# Patient Record
Sex: MALE | Race: WHITE | NOT HISPANIC OR LATINO | Employment: FULL TIME | URBAN - METROPOLITAN AREA
[De-identification: names, ages, dates, MRNs, and addresses within clinical notes are randomized per-mention and may not be internally consistent; named-entity substitution may affect disease eponyms.]

---

## 2018-03-04 ENCOUNTER — HOSPITAL ENCOUNTER (EMERGENCY)
Facility: MEDICAL CENTER | Age: 33
End: 2018-03-04
Attending: EMERGENCY MEDICINE
Payer: COMMERCIAL

## 2018-03-04 VITALS
HEART RATE: 98 BPM | OXYGEN SATURATION: 98 % | WEIGHT: 246.25 LBS | HEIGHT: 66 IN | BODY MASS INDEX: 39.58 KG/M2 | DIASTOLIC BLOOD PRESSURE: 78 MMHG | RESPIRATION RATE: 18 BRPM | SYSTOLIC BLOOD PRESSURE: 135 MMHG | TEMPERATURE: 98 F

## 2018-03-04 DIAGNOSIS — L02.91 ABSCESS: ICD-10-CM

## 2018-03-04 PROCEDURE — 700102 HCHG RX REV CODE 250 W/ 637 OVERRIDE(OP): Performed by: EMERGENCY MEDICINE

## 2018-03-04 PROCEDURE — 303977 HCHG I & D

## 2018-03-04 PROCEDURE — A9270 NON-COVERED ITEM OR SERVICE: HCPCS | Performed by: EMERGENCY MEDICINE

## 2018-03-04 PROCEDURE — 99284 EMERGENCY DEPT VISIT MOD MDM: CPT

## 2018-03-04 RX ORDER — SULFAMETHOXAZOLE AND TRIMETHOPRIM 800; 160 MG/1; MG/1
1 TABLET ORAL ONCE
Status: COMPLETED | OUTPATIENT
Start: 2018-03-04 | End: 2018-03-04

## 2018-03-04 RX ORDER — HYDROCODONE BITARTRATE AND ACETAMINOPHEN 5; 325 MG/1; MG/1
1-2 TABLET ORAL EVERY 6 HOURS PRN
Qty: 10 TAB | Refills: 0 | Status: SHIPPED | OUTPATIENT
Start: 2018-03-04 | End: 2018-03-06

## 2018-03-04 RX ORDER — AMOXICILLIN AND CLAVULANATE POTASSIUM 875; 125 MG/1; MG/1
1 TABLET, FILM COATED ORAL ONCE
Status: COMPLETED | OUTPATIENT
Start: 2018-03-04 | End: 2018-03-04

## 2018-03-04 RX ORDER — AMOXICILLIN AND CLAVULANATE POTASSIUM 875; 125 MG/1; MG/1
1 TABLET, FILM COATED ORAL 2 TIMES DAILY
Qty: 20 TAB | Refills: 0 | Status: SHIPPED | OUTPATIENT
Start: 2018-03-04 | End: 2018-03-14

## 2018-03-04 RX ORDER — SULFAMETHOXAZOLE AND TRIMETHOPRIM 800; 160 MG/1; MG/1
1 TABLET ORAL 2 TIMES DAILY
Qty: 20 TAB | Refills: 0 | Status: SHIPPED | OUTPATIENT
Start: 2018-03-04 | End: 2018-03-14

## 2018-03-04 RX ADMIN — AMOXICILLIN AND CLAVULANATE POTASSIUM 1 TABLET: 875; 125 TABLET, FILM COATED ORAL at 10:13

## 2018-03-04 RX ADMIN — SULFAMETHOXAZOLE AND TRIMETHOPRIM 1 TABLET: 800; 160 TABLET ORAL at 10:13

## 2018-03-04 ASSESSMENT — PAIN SCALES - GENERAL: PAINLEVEL_OUTOF10: 8

## 2018-03-04 NOTE — ED PROVIDER NOTES
"ED Provider Note    CHIEF COMPLAINT  Chief Complaint   Patient presents with   • Abscess     to buttock area, started monday       HPI  Mohsen Smith is a 33 y.o. male who presents with tender swollen area on left buttock. This started about a week ago. There is associated redness. There is significant pain. Pain is nonradiating from the area. Pain is described as throbbing. Seemed like it drained a little bit yesterday, but worse today      REVIEW OF SYSTEMS  See HPI    PAST MEDICAL HISTORY  Denies diabetes    SOCIAL HISTORY  Social History     Social History   • Marital status: Single     Spouse name: N/A   • Number of children: N/A   • Years of education: N/A     Social History Main Topics   • Smoking status: Current Every Day Smoker   • Smokeless tobacco: Current User      Comment: 1/2 ppd   • Alcohol use Yes      Comment: 6 pack every other day   • Drug use: No   • Sexual activity: Not on file     Other Topics Concern   • Not on file     Social History Narrative   • No narrative on file       SURGICAL HISTORY  History reviewed. No pertinent surgical history.    CURRENT MEDICATIONS  Home Medications     Reviewed by Lenora Soto R.N. (Registered Nurse) on 03/04/18 at 0854  Med List Status: Complete   Medication Last Dose Status        Patient Levon Taking any Medications                         ALLERGIES  No Known Allergies    PHYSICAL EXAM  VITAL SIGNS: /83   Pulse 94   Temp 37.1 °C (98.7 °F)   Resp 18   Ht 1.676 m (5' 6\")   Wt 111.7 kg (246 lb 4.1 oz)   SpO2 97%   BMI 39.75 kg/m²   Constitutional: Well developed, Well nourished, No acute distress  HENT:  Atraumatic, Normocephalic  Eyes: sclera white, No discharge.   Neck: Normal range of motion  Lymphatic: No lymphadenopathy noted.   Cardiovascular: Normal heart rate  Thorax & Lungs: No respiratory distress  Skin: Tender indurated abscess with overlying cellulitis left gluteal fold a few centimeters away from the anus.  Psychiatric: " Affect normal      Incision and Drainage Procedure Note    Indication: Abscess    Procedure: The patient was positioned appropriately and the skin over the incision site was prepped with betadine and draped in a sterile fashion. Local anesthesia was obtained by infiltration using 1% Lidocaine with epinephrine.  An incision was then made over the apex of the lesion then foul smelling and purulent material was expressed. Loculations were broken up using a hemostat and more of the material was able to be expressed. The drainage cavity was then irrigated and packed with sterile gauze. The patient’s tetanus status was up to date and did not require a booster dose.    The patient tolerated the procedure well.    Complications: None    COURSE & MEDICAL DECISION MAKING  Abscess drained. Patient nontoxic. Patient should do well with outpatient treatment. Patient instructed to change dressing daily, keep clean and allow drainage. Patient will be treated with Augmentin and Bactrim. Also given Norco for uncontrolled pain. Patient should return to the emergency department in 2-3 days for recheck or follow up with primary physician. Patient should return sooner for high fevers, expanding redness, worsening, not improving or concern.    FINAL IMPRESSION  1. abscess  2. Incision and drainage of abscess    Blood pressure reviewed and likely elevated secondary to medical condition. Advised home monitoring and followup.  *    Disposition: Home    In prescribing controlled substances to this patient, I certify that I have obtained and reviewed the medical history of Mohsen Smith. I have also made a good adriana effort to obtain applicable records from other providers who have treated the patient and records did not demonstrate any increased risk of substance abuse that would prevent me from prescribing controlled substances.     I have conducted a physical exam and documented it. I have reviewed Mr. Smith’s prescription history  as maintained by the [place] Prescription Monitoring Program.     I have assessed the patient’s risk for abuse, dependency, and addiction using the validated Opioid Risk Tool available at https://www.mdcalc.com/vbxazy-ppxa-sdcl-ort-narcotic-abuse.     Given the above, I believe the benefits of controlled substance therapy outweigh the risks. The reasons for prescribing controlled substances include non-narcotic, oral analgesic alternatives have been inadequate for pain control. Accordingly, I have discussed the risk and benefits, treatment plan, and alternative therapies with the patient.     Please note that this dictation was created using voice recognition software. I have worked with consultants from the vendor as well as technical experts from [facility] to optimize the interface. I have made every reasonable attempt to correct obvious errors, but I expect that there are errors of grammar and possibly content that I did not discover before finalizing the note.      Electronically signed by: Jose Guadalupe Maurice, 3/4/2018 10:09 AM

## 2018-03-04 NOTE — DISCHARGE INSTRUCTIONS
Abscess  Care After  An abscess (also called a boil or furuncle) is an infected area that contains a collection of pus. Signs and symptoms of an abscess include pain, tenderness, redness, or hardness, or you may feel a moveable soft area under your skin. An abscess can occur anywhere in the body. The infection may spread to surrounding tissues causing cellulitis. A cut (incision) by the surgeon was made over your abscess and the pus was drained out. Gauze may have been packed into the space to provide a drain that will allow the cavity to heal from the inside outwards. The boil may be painful for 5 to 7 days. Most people with a boil do not have high fevers. Your abscess, if seen early, may not have localized, and may not have been lanced. If not, another appointment may be required for this if it does not get better on its own or with medications.  HOME CARE INSTRUCTIONS   · Only take over-the-counter or prescription medicines for pain, discomfort, or fever as directed by your caregiver.  · When you bathe, soak and then remove gauze or iodoform packs at least daily or as directed by your caregiver. You may then wash the wound gently with mild soapy water. Repack with gauze or do as your caregiver directs.  SEEK IMMEDIATE MEDICAL CARE IF:   · You develop increased pain, swelling, redness, drainage, or bleeding in the wound site.  · You develop signs of generalized infection including muscle aches, chills, fever, or a general ill feeling.  · An oral temperature above 102° F (38.9° C) develops, not controlled by medication.  See your caregiver for a recheck if you develop any of the symptoms described above. If medications (antibiotics) were prescribed, take them as directed.  Document Released: 07/06/2006 Document Revised: 03/11/2013 Document Reviewed: 03/02/2009  [employer]® Patient Information ©2014 [employer].

## 2018-03-07 ENCOUNTER — HOSPITAL ENCOUNTER (EMERGENCY)
Facility: MEDICAL CENTER | Age: 33
End: 2018-03-07
Attending: EMERGENCY MEDICINE
Payer: COMMERCIAL

## 2018-03-07 VITALS
SYSTOLIC BLOOD PRESSURE: 131 MMHG | BODY MASS INDEX: 35.19 KG/M2 | HEIGHT: 70 IN | OXYGEN SATURATION: 97 % | HEART RATE: 106 BPM | TEMPERATURE: 97.6 F | RESPIRATION RATE: 20 BRPM | WEIGHT: 245.81 LBS | DIASTOLIC BLOOD PRESSURE: 81 MMHG

## 2018-03-07 DIAGNOSIS — Z51.89 WOUND CHECK, ABSCESS: ICD-10-CM

## 2018-03-07 PROCEDURE — 99282 EMERGENCY DEPT VISIT SF MDM: CPT

## 2018-03-07 ASSESSMENT — PAIN SCALES - GENERAL: PAINLEVEL_OUTOF10: 0

## 2018-03-07 NOTE — ED PROVIDER NOTES
"ED Provider Note      CHIEF COMPLAINT  Chief Complaint   Patient presents with   • Packing Removal   • Wound Check       HPI  Mohsen Smith is a 33 y.o. male who presents for evaluation of I+D site.    Patient had an abscess on his buttock region incised and drained several days ago. It was packed and he was told to return for packing removal. Patient states he has very minimal pain and tenderness compared to prior to the procedure. Patient denies fever.      ALLERGIES  No Known Allergies    CURRENT MEDICATIONS  Keflex and Bactrim    PAST MEDICAL HISTORY     Eczema    SURGICAL HISTORY  patient denies any surgical history    SOCIAL HISTORY  Social History     Social History Main Topics   • Smoking status: Current Every Day Smoker   • Smokeless tobacco: Current User      Comment: 1/2 ppd   • Alcohol use Yes      Comment: 6 pack every other day   • Drug use: No   • Sexual activity: Not on file         REVIEW OF SYSTEMS  Patient admits to wound on his right buttock   Pt denies drainage, fever, pain  See HPI for further details.       PHYSICAL EXAM  VITAL SIGNS: /81   Pulse (!) 106   Temp 36.4 °C (97.6 °F) (Temporal)   Resp 20   Ht 1.778 m (5' 10\")   Wt 111.5 kg (245 lb 13 oz)   SpO2 97%   BMI 35.27 kg/m²     General:  WDWN, nontoxic appearing in NAD; A+Ox3; V/S as above; tachycardic at triage; afebrile  Skin: warm and dry; good color; scattered circular patchy erythematous lesions over the patient's extremities  HEENT: NCAT; EOMs intact; PERRL; no scleral icterus   Neck: FROM; no meningismus, no LAD  Extremities: KAPOOR x 4; no e/o trauma; no pedal edema; open wound measuring 2 mm on the medial aspect of the right buttock without drainage, tenderness, or erythema; there is mild induration but no crepitus or streaking  Neurologic: CNs III-XII grossly intact; speech clear; distal sensation intact; strength 5/5 UE/LEs; gait steady  Psychiatric: Appropriate affect, normal mood    MEDICAL RECORD  I have " reviewed the patient's medical record and pertinent results as listed.      COURSE & MEDICAL DECISION MAKING  I have reviewed any medical record information, laboratory studies and radiographic results as noted.    Mohsen Smith is a 33 y.o. male who presents for wound evaluation following packing falling out. No need to pack the wound again. It seems to be healing well. I encouraged the patient to continue with sitz baths and warm compresses as well as finish the antibiotics. He should return to the ER for fever, increased pain, or other concerns.      Pt's blood pressure was noted to be above 120/80 here in the ER.  Pt was informed and advised to follow up as an outpatient for recheck for possible dx/management of hypertension.      FINAL IMPRESSION  1. Wound check, abscess             Electronically signed by: Bisi Chairez, 3/7/2018 8:03 AM

## 2018-03-07 NOTE — ED TRIAGE NOTES
32 y/o male ambulatory to triage for wound check and possible packing of yvette-rectal abscess. Pt was seen here on Sunday and was told to return today, his packing came out in the shower this morning.

## 2018-03-07 NOTE — DISCHARGE INSTRUCTIONS
Continue with warm compresses and baths to encourage more drainage  Continue with antibiotics as prescribed

## 2018-03-07 NOTE — ED NOTES
Pt verbalizes understanding of discharge instructions. Patient to follow up with [facility] or [facility] clinic . Patient ambulatory to discharge with steady gait. NAD or deficits noted at time of discharge

## 2023-08-04 ENCOUNTER — OFFICE VISIT (OUTPATIENT)
Dept: URGENT CARE | Facility: PHYSICIAN GROUP | Age: 38
End: 2023-08-04
Payer: COMMERCIAL

## 2023-08-04 ENCOUNTER — APPOINTMENT (OUTPATIENT)
Dept: RADIOLOGY | Facility: MEDICAL CENTER | Age: 38
DRG: 392 | End: 2023-08-04
Attending: EMERGENCY MEDICINE
Payer: COMMERCIAL

## 2023-08-04 ENCOUNTER — HOSPITAL ENCOUNTER (INPATIENT)
Facility: MEDICAL CENTER | Age: 38
LOS: 2 days | DRG: 392 | End: 2023-08-06
Attending: EMERGENCY MEDICINE | Admitting: INTERNAL MEDICINE
Payer: COMMERCIAL

## 2023-08-04 VITALS
RESPIRATION RATE: 16 BRPM | BODY MASS INDEX: 34.3 KG/M2 | DIASTOLIC BLOOD PRESSURE: 86 MMHG | HEIGHT: 71 IN | TEMPERATURE: 100 F | WEIGHT: 245 LBS | SYSTOLIC BLOOD PRESSURE: 124 MMHG | HEART RATE: 100 BPM | OXYGEN SATURATION: 98 %

## 2023-08-04 DIAGNOSIS — K57.32 SIGMOID DIVERTICULITIS: ICD-10-CM

## 2023-08-04 DIAGNOSIS — A41.9 SEPSIS WITHOUT ACUTE ORGAN DYSFUNCTION, DUE TO UNSPECIFIED ORGANISM (HCC): ICD-10-CM

## 2023-08-04 DIAGNOSIS — R10.30 LOWER ABDOMINAL PAIN: ICD-10-CM

## 2023-08-04 PROBLEM — E66.9 OBESITY (BMI 35.0-39.9 WITHOUT COMORBIDITY): Status: ACTIVE | Noted: 2023-08-04

## 2023-08-04 PROBLEM — D72.829 LEUKOCYTOSIS: Status: ACTIVE | Noted: 2023-08-04

## 2023-08-04 PROBLEM — Z72.0 TOBACCO ABUSE: Status: ACTIVE | Noted: 2023-08-04

## 2023-08-04 PROBLEM — E87.29 HIGH ANION GAP METABOLIC ACIDOSIS: Status: ACTIVE | Noted: 2023-08-04

## 2023-08-04 LAB
ALBUMIN SERPL BCP-MCNC: 4.7 G/DL (ref 3.2–4.9)
ALBUMIN/GLOB SERPL: 1.2 G/DL
ALP SERPL-CCNC: 86 U/L (ref 30–99)
ALT SERPL-CCNC: 30 U/L (ref 2–50)
ANION GAP SERPL CALC-SCNC: 17 MMOL/L (ref 7–16)
APPEARANCE UR: CLEAR
AST SERPL-CCNC: 19 U/L (ref 12–45)
BACTERIA #/AREA URNS HPF: NEGATIVE /HPF
BASOPHILS # BLD AUTO: 0.4 % (ref 0–1.8)
BASOPHILS # BLD: 0.09 K/UL (ref 0–0.12)
BILIRUB SERPL-MCNC: 0.8 MG/DL (ref 0.1–1.5)
BILIRUB UR QL STRIP.AUTO: ABNORMAL
BUN SERPL-MCNC: 10 MG/DL (ref 8–22)
CALCIUM ALBUM COR SERPL-MCNC: 9 MG/DL (ref 8.5–10.5)
CALCIUM SERPL-MCNC: 9.6 MG/DL (ref 8.5–10.5)
CHLORIDE SERPL-SCNC: 102 MMOL/L (ref 96–112)
CO2 SERPL-SCNC: 18 MMOL/L (ref 20–33)
COLOR UR: ABNORMAL
CREAT SERPL-MCNC: 0.92 MG/DL (ref 0.5–1.4)
EOSINOPHIL # BLD AUTO: 0.01 K/UL (ref 0–0.51)
EOSINOPHIL NFR BLD: 0 % (ref 0–6.9)
EPI CELLS #/AREA URNS HPF: ABNORMAL /HPF
ERYTHROCYTE [DISTWIDTH] IN BLOOD BY AUTOMATED COUNT: 46.2 FL (ref 35.9–50)
GFR SERPLBLD CREATININE-BSD FMLA CKD-EPI: 107 ML/MIN/1.73 M 2
GLOBULIN SER CALC-MCNC: 4 G/DL (ref 1.9–3.5)
GLUCOSE SERPL-MCNC: 110 MG/DL (ref 65–99)
GLUCOSE UR STRIP.AUTO-MCNC: NEGATIVE MG/DL
HCT VFR BLD AUTO: 52.2 % (ref 42–52)
HGB BLD-MCNC: 17.6 G/DL (ref 14–18)
HYALINE CASTS #/AREA URNS LPF: ABNORMAL /LPF
IMM GRANULOCYTES # BLD AUTO: 0.11 K/UL (ref 0–0.11)
IMM GRANULOCYTES NFR BLD AUTO: 0.5 % (ref 0–0.9)
KETONES UR STRIP.AUTO-MCNC: 80 MG/DL
LACTATE SERPL-SCNC: 1.1 MMOL/L (ref 0.5–2)
LEUKOCYTE ESTERASE UR QL STRIP.AUTO: ABNORMAL
LIPASE SERPL-CCNC: 14 U/L (ref 11–82)
LYMPHOCYTES # BLD AUTO: 1.27 K/UL (ref 1–4.8)
LYMPHOCYTES NFR BLD: 5.8 % (ref 22–41)
MCH RBC QN AUTO: 31.8 PG (ref 27–33)
MCHC RBC AUTO-ENTMCNC: 33.7 G/DL (ref 32.3–36.5)
MCV RBC AUTO: 94.4 FL (ref 81.4–97.8)
MICRO URNS: ABNORMAL
MONOCYTES # BLD AUTO: 1.78 K/UL (ref 0–0.85)
MONOCYTES NFR BLD AUTO: 8.1 % (ref 0–13.4)
MUCOUS THREADS #/AREA URNS HPF: ABNORMAL /HPF
NEUTROPHILS # BLD AUTO: 18.59 K/UL (ref 1.82–7.42)
NEUTROPHILS NFR BLD: 85.2 % (ref 44–72)
NITRITE UR QL STRIP.AUTO: NEGATIVE
NRBC # BLD AUTO: 0 K/UL
NRBC BLD-RTO: 0 /100 WBC (ref 0–0.2)
PH UR STRIP.AUTO: 5.5 [PH] (ref 5–8)
PLATELET # BLD AUTO: 265 K/UL (ref 164–446)
PMV BLD AUTO: 8.9 FL (ref 9–12.9)
POTASSIUM SERPL-SCNC: 3.9 MMOL/L (ref 3.6–5.5)
PROT SERPL-MCNC: 8.7 G/DL (ref 6–8.2)
PROT UR QL STRIP: 100 MG/DL
RBC # BLD AUTO: 5.53 M/UL (ref 4.7–6.1)
RBC # URNS HPF: ABNORMAL /HPF
RBC UR QL AUTO: NEGATIVE
SODIUM SERPL-SCNC: 137 MMOL/L (ref 135–145)
SP GR UR STRIP.AUTO: 1.03
UROBILINOGEN UR STRIP.AUTO-MCNC: 1 MG/DL
WBC # BLD AUTO: 21.9 K/UL (ref 4.8–10.8)
WBC #/AREA URNS HPF: ABNORMAL /HPF

## 2023-08-04 PROCEDURE — 700117 HCHG RX CONTRAST REV CODE 255: Performed by: EMERGENCY MEDICINE

## 2023-08-04 PROCEDURE — 700105 HCHG RX REV CODE 258: Performed by: INTERNAL MEDICINE

## 2023-08-04 PROCEDURE — 700105 HCHG RX REV CODE 258: Performed by: EMERGENCY MEDICINE

## 2023-08-04 PROCEDURE — 36415 COLL VENOUS BLD VENIPUNCTURE: CPT

## 2023-08-04 PROCEDURE — 99285 EMERGENCY DEPT VISIT HI MDM: CPT

## 2023-08-04 PROCEDURE — 770006 HCHG ROOM/CARE - MED/SURG/GYN SEMI*

## 2023-08-04 PROCEDURE — 74177 CT ABD & PELVIS W/CONTRAST: CPT

## 2023-08-04 PROCEDURE — 85025 COMPLETE CBC W/AUTO DIFF WBC: CPT

## 2023-08-04 PROCEDURE — 700111 HCHG RX REV CODE 636 W/ 250 OVERRIDE (IP): Performed by: INTERNAL MEDICINE

## 2023-08-04 PROCEDURE — A9270 NON-COVERED ITEM OR SERVICE: HCPCS | Performed by: INTERNAL MEDICINE

## 2023-08-04 PROCEDURE — 96376 TX/PRO/DX INJ SAME DRUG ADON: CPT

## 2023-08-04 PROCEDURE — 700102 HCHG RX REV CODE 250 W/ 637 OVERRIDE(OP): Performed by: INTERNAL MEDICINE

## 2023-08-04 PROCEDURE — 80053 COMPREHEN METABOLIC PANEL: CPT

## 2023-08-04 PROCEDURE — 87040 BLOOD CULTURE FOR BACTERIA: CPT | Mod: 91

## 2023-08-04 PROCEDURE — 700111 HCHG RX REV CODE 636 W/ 250 OVERRIDE (IP): Mod: JZ | Performed by: EMERGENCY MEDICINE

## 2023-08-04 PROCEDURE — 96365 THER/PROPH/DIAG IV INF INIT: CPT

## 2023-08-04 PROCEDURE — 3079F DIAST BP 80-89 MM HG: CPT

## 2023-08-04 PROCEDURE — 83690 ASSAY OF LIPASE: CPT

## 2023-08-04 PROCEDURE — 99406 BEHAV CHNG SMOKING 3-10 MIN: CPT | Performed by: INTERNAL MEDICINE

## 2023-08-04 PROCEDURE — 96375 TX/PRO/DX INJ NEW DRUG ADDON: CPT

## 2023-08-04 PROCEDURE — 99223 1ST HOSP IP/OBS HIGH 75: CPT | Mod: AI,25 | Performed by: INTERNAL MEDICINE

## 2023-08-04 PROCEDURE — 3074F SYST BP LT 130 MM HG: CPT

## 2023-08-04 PROCEDURE — 99203 OFFICE O/P NEW LOW 30 MIN: CPT

## 2023-08-04 PROCEDURE — 83605 ASSAY OF LACTIC ACID: CPT

## 2023-08-04 PROCEDURE — 81001 URINALYSIS AUTO W/SCOPE: CPT

## 2023-08-04 PROCEDURE — 99406 BEHAV CHNG SMOKING 3-10 MIN: CPT

## 2023-08-04 RX ORDER — POLYETHYLENE GLYCOL 3350 17 G/17G
1 POWDER, FOR SOLUTION ORAL
Status: DISCONTINUED | OUTPATIENT
Start: 2023-08-04 | End: 2023-08-04

## 2023-08-04 RX ORDER — SODIUM CHLORIDE 9 MG/ML
30 INJECTION, SOLUTION INTRAVENOUS ONCE
Status: COMPLETED | OUTPATIENT
Start: 2023-08-04 | End: 2023-08-04

## 2023-08-04 RX ORDER — SODIUM CHLORIDE 9 MG/ML
INJECTION, SOLUTION INTRAVENOUS CONTINUOUS
Status: DISCONTINUED | OUTPATIENT
Start: 2023-08-04 | End: 2023-08-05

## 2023-08-04 RX ORDER — ACETAMINOPHEN 325 MG/1
650 TABLET ORAL EVERY 6 HOURS PRN
Status: DISCONTINUED | OUTPATIENT
Start: 2023-08-04 | End: 2023-08-06 | Stop reason: HOSPADM

## 2023-08-04 RX ORDER — ENOXAPARIN SODIUM 100 MG/ML
40 INJECTION SUBCUTANEOUS DAILY
Status: DISCONTINUED | OUTPATIENT
Start: 2023-08-05 | End: 2023-08-06 | Stop reason: HOSPADM

## 2023-08-04 RX ORDER — ONDANSETRON 2 MG/ML
4 INJECTION INTRAMUSCULAR; INTRAVENOUS EVERY 4 HOURS PRN
Status: DISCONTINUED | OUTPATIENT
Start: 2023-08-04 | End: 2023-08-06 | Stop reason: HOSPADM

## 2023-08-04 RX ORDER — CEFTRIAXONE 2 G/1
2000 INJECTION, POWDER, FOR SOLUTION INTRAMUSCULAR; INTRAVENOUS ONCE
Status: COMPLETED | OUTPATIENT
Start: 2023-08-04 | End: 2023-08-04

## 2023-08-04 RX ORDER — METRONIDAZOLE 500 MG/100ML
500 INJECTION, SOLUTION INTRAVENOUS ONCE
Status: COMPLETED | OUTPATIENT
Start: 2023-08-04 | End: 2023-08-04

## 2023-08-04 RX ORDER — AMOXICILLIN 250 MG
2 CAPSULE ORAL 2 TIMES DAILY
Status: DISCONTINUED | OUTPATIENT
Start: 2023-08-04 | End: 2023-08-04

## 2023-08-04 RX ORDER — PROCHLORPERAZINE EDISYLATE 5 MG/ML
5-10 INJECTION INTRAMUSCULAR; INTRAVENOUS EVERY 4 HOURS PRN
Status: DISCONTINUED | OUTPATIENT
Start: 2023-08-04 | End: 2023-08-06 | Stop reason: HOSPADM

## 2023-08-04 RX ORDER — OXYCODONE HYDROCHLORIDE AND ACETAMINOPHEN 5; 325 MG/1; MG/1
1 TABLET ORAL EVERY 4 HOURS PRN
Status: DISCONTINUED | OUTPATIENT
Start: 2023-08-04 | End: 2023-08-06 | Stop reason: HOSPADM

## 2023-08-04 RX ORDER — GAUZE BANDAGE 2" X 2"
100 BANDAGE TOPICAL DAILY
Status: DISCONTINUED | OUTPATIENT
Start: 2023-08-05 | End: 2023-08-06 | Stop reason: HOSPADM

## 2023-08-04 RX ORDER — PROMETHAZINE HYDROCHLORIDE 25 MG/1
12.5-25 TABLET ORAL EVERY 4 HOURS PRN
Status: DISCONTINUED | OUTPATIENT
Start: 2023-08-04 | End: 2023-08-06 | Stop reason: HOSPADM

## 2023-08-04 RX ORDER — ONDANSETRON 4 MG/1
4 TABLET, ORALLY DISINTEGRATING ORAL EVERY 4 HOURS PRN
Status: DISCONTINUED | OUTPATIENT
Start: 2023-08-04 | End: 2023-08-06 | Stop reason: HOSPADM

## 2023-08-04 RX ORDER — METRONIDAZOLE 500 MG/100ML
500 INJECTION, SOLUTION INTRAVENOUS 2 TIMES DAILY
Status: DISCONTINUED | OUTPATIENT
Start: 2023-08-04 | End: 2023-08-06 | Stop reason: HOSPADM

## 2023-08-04 RX ORDER — MORPHINE SULFATE 4 MG/ML
2 INJECTION INTRAVENOUS EVERY 4 HOURS PRN
Status: DISCONTINUED | OUTPATIENT
Start: 2023-08-04 | End: 2023-08-06 | Stop reason: HOSPADM

## 2023-08-04 RX ORDER — MORPHINE SULFATE 4 MG/ML
4 INJECTION INTRAVENOUS ONCE
Status: COMPLETED | OUTPATIENT
Start: 2023-08-04 | End: 2023-08-04

## 2023-08-04 RX ORDER — BISACODYL 10 MG
10 SUPPOSITORY, RECTAL RECTAL
Status: DISCONTINUED | OUTPATIENT
Start: 2023-08-04 | End: 2023-08-04

## 2023-08-04 RX ORDER — PROMETHAZINE HYDROCHLORIDE 25 MG/1
12.5-25 SUPPOSITORY RECTAL EVERY 4 HOURS PRN
Status: DISCONTINUED | OUTPATIENT
Start: 2023-08-04 | End: 2023-08-06 | Stop reason: HOSPADM

## 2023-08-04 RX ADMIN — SODIUM CHLORIDE 2259 ML: 9 INJECTION, SOLUTION INTRAVENOUS at 14:10

## 2023-08-04 RX ADMIN — METRONIDAZOLE 500 MG: 500 INJECTION, SOLUTION INTRAVENOUS at 21:42

## 2023-08-04 RX ADMIN — OXYCODONE AND ACETAMINOPHEN 1 TABLET: 5; 325 TABLET ORAL at 18:50

## 2023-08-04 RX ADMIN — METRONIDAZOLE 500 MG: 500 INJECTION, SOLUTION INTRAVENOUS at 14:23

## 2023-08-04 RX ADMIN — MORPHINE SULFATE 4 MG: 4 INJECTION, SOLUTION INTRAMUSCULAR; INTRAVENOUS at 16:25

## 2023-08-04 RX ADMIN — MORPHINE SULFATE 4 MG: 4 INJECTION, SOLUTION INTRAMUSCULAR; INTRAVENOUS at 14:04

## 2023-08-04 RX ADMIN — SODIUM CHLORIDE: 9 INJECTION, SOLUTION INTRAVENOUS at 18:55

## 2023-08-04 RX ADMIN — CEFTRIAXONE SODIUM 2000 MG: 2 INJECTION, POWDER, FOR SOLUTION INTRAMUSCULAR; INTRAVENOUS at 14:08

## 2023-08-04 RX ADMIN — IOHEXOL 100 ML: 350 INJECTION, SOLUTION INTRAVENOUS at 16:03

## 2023-08-04 ASSESSMENT — ENCOUNTER SYMPTOMS
EYE PAIN: 0
HALLUCINATIONS: 0
BLOOD IN STOOL: 0
VOMITING: 1
SENSORY CHANGE: 0
TINGLING: 0
COUGH: 0
CONSTIPATION: 0
DIARRHEA: 1
HEADACHES: 0
ABDOMINAL PAIN: 1
BACK PAIN: 0
SPUTUM PRODUCTION: 0
TREMORS: 0
NAUSEA: 1
SHORTNESS OF BREATH: 0
DOUBLE VISION: 0
WEIGHT LOSS: 0
SPEECH CHANGE: 0
PHOTOPHOBIA: 0
CHILLS: 0
BLURRED VISION: 0
FOCAL WEAKNESS: 0
NECK PAIN: 0
DIZZINESS: 0
PALPITATIONS: 0
ORTHOPNEA: 0
FEVER: 0
MYALGIAS: 0

## 2023-08-04 ASSESSMENT — LIFESTYLE VARIABLES
DOES PATIENT WANT TO STOP DRINKING: NO
SUBSTANCE_ABUSE: 0
EVER HAD A DRINK FIRST THING IN THE MORNING TO STEADY YOUR NERVES TO GET RID OF A HANGOVER: YES
HAVE PEOPLE ANNOYED YOU BY CRITICIZING YOUR DRINKING: NO
TOTAL SCORE: 1
AVERAGE NUMBER OF DAYS PER WEEK YOU HAVE A DRINK CONTAINING ALCOHOL: 7
CONSUMPTION TOTAL: POSITIVE
ALCOHOL_USE: YES
HOW MANY TIMES IN THE PAST YEAR HAVE YOU HAD 5 OR MORE DRINKS IN A DAY: 7
EVER FELT BAD OR GUILTY ABOUT YOUR DRINKING: NO
ON A TYPICAL DAY WHEN YOU DRINK ALCOHOL HOW MANY DRINKS DO YOU HAVE: 6
HAVE YOU EVER FELT YOU SHOULD CUT DOWN ON YOUR DRINKING: NO
TOTAL SCORE: 1
TOTAL SCORE: 1

## 2023-08-04 ASSESSMENT — COGNITIVE AND FUNCTIONAL STATUS - GENERAL
SUGGESTED CMS G CODE MODIFIER DAILY ACTIVITY: CH
MOBILITY SCORE: 24
SUGGESTED CMS G CODE MODIFIER MOBILITY: CH
DAILY ACTIVITIY SCORE: 24

## 2023-08-04 ASSESSMENT — PATIENT HEALTH QUESTIONNAIRE - PHQ9
2. FEELING DOWN, DEPRESSED, IRRITABLE, OR HOPELESS: NOT AT ALL
1. LITTLE INTEREST OR PLEASURE IN DOING THINGS: NOT AT ALL
SUM OF ALL RESPONSES TO PHQ9 QUESTIONS 1 AND 2: 0

## 2023-08-04 ASSESSMENT — FIBROSIS 4 INDEX: FIB4 SCORE: 0.52

## 2023-08-04 ASSESSMENT — PAIN DESCRIPTION - PAIN TYPE
TYPE: ACUTE PAIN
TYPE: ACUTE PAIN

## 2023-08-04 NOTE — ED TRIAGE NOTES
Chief Complaint   Patient presents with    Abdominal Pain     X3 days. Lower midline. Pt endorses diarrhea and dysuria, denies blood in urine or stool.      Pt ambulatory to triage for above complaint.      Pt is alert/oriented and follows commands. Pt speaking in full sentences and responds appropriately to questions. No acute distress noted in triage and respirations are even and unlabored.     Pt placed in lobby and educated on triage process. Pt encouraged to alert staff for any changes in condition.

## 2023-08-04 NOTE — ED PROVIDER NOTES
Emergency Physician Note    Chief Concern:  Chief Complaint   Patient presents with    Abdominal Pain     X3 days. Lower midline. Pt endorses diarrhea and dysuria, denies blood in urine or stool.          Limitation to History:  Select: : None    HPI/ROS   Outside Historians:   None     External Records Reviewed:  Care everywhere Mr. Smith was seen in urgent care earlier today, APRN note reviewed from that visit.  He reported lower abdominal pain that began 2 days ago.  Physical exam notes tenderness to the left lower middle abdomen, as well as slight erythema to the tissue in the middle abdomen.  He was advised to go to the emergency department for further evaluation.    HPI:  Mohsen Smith is a 38 y.o. male who presents to the emergency department today for evaluation of lower abdominal pain.  His symptoms began 2 days ago, he describes very gradual onset of pain localized to the suprapubic area, possibly radiating towards the right lower quadrant.  Over the past few days symptoms have progressively worsened.  He has not noticed any fevers, states the pain is alleviated by lying on his back, exacerbated by lying on his left side.  He reports no personal history of similar symptoms, but states he has a strong family history of diverticulitis.  He has no prior history of appendectomy.  He initially presented to urgent care, and was sent to the emergency department for further evaluation.  He reports no history of impaired immunity, no history of diabetes.  States he is not currently on any chronic medications.  He did have a Betty cheese steak shortly before the symptoms began, however he has not had any significant vomiting or diarrhea.  He has not noticed any skin changes, or masses in the abdomen.  He reports no urinary symptoms.    PAST MEDICAL HISTORY  History reviewed. No pertinent past medical history.    SURGICAL HISTORY  History reviewed. No pertinent surgical history.    FAMILY HISTORY  History  "reviewed. No pertinent family history.    SOCIAL HISTORY   reports that he has been smoking cigarettes. He has been smoking an average of .5 packs per day. He uses smokeless tobacco. He reports current alcohol use. He reports that he does not use drugs.    CURRENT MEDICATIONS  Previous Medications    No medications on file       ALLERGIES  Patient has no known allergies.    PHYSICAL EXAM  Vital Signs: /77   Pulse 100   Temp 36.2 °C (97.2 °F) (Oral)   Resp 15   Ht 1.803 m (5' 11\")   Wt 116 kg (254 lb 13.6 oz)   SpO2 91%   BMI 35.54 kg/m²   Constitutional: Alert, no acute distress  HENT: Normocephalic, atraumatic.  Cardiovascular: Tachycardic rate, regular rhythm, no murmur appreciated  Pulmonary: Breath sounds clear and equal bilaterally, no wheezing, no coarse breath sounds  Abdomen:  Soft, nondistended, he does have mild to moderate tenderness to palpation of the suprapubic and periumbilical region without rebound or guarding.  No palpable masses, though abdominal exam is somewhat limited by BMI.  Skin: Warm, dry, no rashes or lesions  Musculoskeletal: Normal range of motion in all extremities, no swelling or deformity noted  Neurologic: Alert, oriented, normal motor function, no speech deficits    Diagnostic Studies & Procedures    Labs:  All labs reviewed by me as noted below.    Radiology:  The attending Emergency Physician has independently interpreted the following imaging:  I independently interpreted the CT of the abdomen and pelvis, fat stranding present in the left lower quadrant    CT-ABDOMEN-PELVIS WITH   Final Result      1.  Sigmoid diverticulitis with probable microperforation. No evidence of drainable fluid collection.   2.  Hepatic steatosis.   3.  Hepatomegaly.          Course and Medical Decision Making    ED Observation Status? No; Patient does not meet criteria for ED Observation.     Initial Assessment and Plan  Care Narrative: Mr. Smith presents to the emergency department today " for evaluation of lower abdominal pain.  His abdominal exam is reassuring without peritoneal signs, however vital signs are concerning for Sirs or sepsis.  He is tachycardic to 122 on my initial assessment.  For this reason sepsis protocol was initiated including IV fluid bolus.  I do not appreciate any palpable masses, though incarcerated hernia is a consideration.  Diverticulitis or appendicitis, as well as urinary infection are also on my differential.    On laboratory evaluation CMP is reassuring without any significant abnormalities.  Liver enzymes are within normal limits, less concerning for biliary pathology.  White blood count is significantly elevated to 21.9. Hemoglobin and platelet counts are within normal limits.  No bands resulted at this time.  Lipase is within normal limits, less concerning for pancreatitis.  Urinalysis is negative for evidence of infection, nitrate negative, positive for ketones and protein.  Lactic acid is within normal reference range at 1.1.    Given elevated white blood count with tachycardia and abdominal pain, IV antibiotics were initiated out of concern for sepsis with intra-abdominal source.  IV fluid bolus was administered with improvement in heart rate.  IV morphine administered for pain control.  IV antibiotics initiated as well.    CT imaging demonstrates sigmoid diverticulitis with probable microperforation.  No evidence of drainable fluid collection.    Plan this time is for admission to hospitalist service for ongoing antibiotics, fluid resuscitation, and continued monitoring.  After sepsis fluid bolus heart rate has improved to 100.  He remained afebrile throughout his stay in the emergency department and normotensive.  Vital signs and lab work remained concerning for sepsis, no evidence of septic shock.    CRITICAL CARE  The very real possibilty of a deterioration of this patient's condition required the highest level of my preparedness for sudden, emergent  intervention.  I provided critical care services, which included medication orders, frequent reevaluations of the patient's condition and response to treatment, ordering and reviewing test results, and discussing the case with various consultants.  The critical care time associated with the care of the patient was 40 minutes. Review chart for interventions. This time is exclusive of any other billable procedures.     Additional Problems and Disposition    I have discussed management of the patient with the following physician:  Dr. Goddard, Hospitalist    DISPOSITION:  Patient will be hospitalized in guarded condition.     FINAL IMPRESSION   1. Lower abdominal pain    2. Sigmoid diverticulitis    3. Sepsis without acute organ dysfunction, due to unspecified organism (HCC)    The critical care time associated with the care of the patient was 40 minutes.     IMaci (Scribe), am scribing for, and in the presence of, Kath Simms M.D..    Electronically signed by: Maci Stephens (Scribe), 8/4/2023    Kath NEVAREZ M.D. personally performed the services described in this documentation, as scribed by Maci Stephens in my presence, and it is both accurate and complete.    The note accurately reflects work and decisions made by me.  Kath Simms M.D.  8/4/2023  5:45 PM

## 2023-08-04 NOTE — ED NOTES
Med Rec completed per patient   Allergies reviewed  No ORAL antibiotics in last 30 days    Patient states that he is not currently taking any daily medications at this time

## 2023-08-04 NOTE — PROGRESS NOTES
"Subjective     Mohsen Smith is a 38 y.o. male who presents with GI Problem (Pain and discomfort x2d, states he thought it was gas, but has had bowel movement and pain has not gone away.)            HPI patient states he started having extreme lower abdominal pain on Wednesday.  He states he was not sure if it was gas or diverticulitis.  He states that his mother and his girlfriend both have diverticulitis.  He states that he was feeling a little better on Thursday but then it has gotten worse again.  He states the pain is always there just fluctuates.  He states that home bowel movement increases his abdominal pain.  He states typically this has been diarrhea, 4-5 times a day.  He states he has not taken anything such as Tylenol or ibuprofen.  He states he has not eaten anything different.  He states he can be a heavy drinker, but has not had any alcohol since Wednesday.      ROS as above       No Known Allergies    No current outpatient medications on file.        No past medical history on file.     Social History     Socioeconomic History    Marital status: Single   Tobacco Use    Smoking status: Every Day    Smokeless tobacco: Current    Tobacco comments:     1/2 ppd   Substance and Sexual Activity    Alcohol use: Yes     Comment: 6 pack every other day    Drug use: No            Objective     /86   Pulse 100   Temp 37.8 °C (100 °F) (Temporal)   Resp 16   Ht 1.803 m (5' 11\")   Wt 111 kg (245 lb)   SpO2 98%   BMI 34.17 kg/m²      Physical Exam  Vitals reviewed.   Constitutional:       General: He is in acute distress.      Appearance: Normal appearance. He is not toxic-appearing.   HENT:      Head: Normocephalic and atraumatic.      Right Ear: External ear normal.      Left Ear: External ear normal.      Nose: Nose normal.      Mouth/Throat:      Mouth: Mucous membranes are moist.   Eyes:      Conjunctiva/sclera: Conjunctivae normal.   Cardiovascular:      Rate and Rhythm: Normal rate. "   Pulmonary:      Effort: Pulmonary effort is normal. No respiratory distress.   Abdominal:      General: There is distension.      Palpations: Abdomen is soft.      Tenderness: There is abdominal tenderness. There is no guarding or rebound.      Comments: There are increased bowel sounds I in right upper, full, left upper abdominal regions.  Left lower, middle regions with decreased bowel sounds   Musculoskeletal:         General: Normal range of motion.      Cervical back: Normal range of motion.   Skin:     General: Skin is warm and dry.      Capillary Refill: Capillary refill takes less than 2 seconds.   Neurological:      Mental Status: He is alert and oriented to person, place, and time.           Assessment & Plan      Presents today due to several days of severe abdominal pain.  He states it has improved slightly off-and-on which is why he did not come in until today.  He states again yesterday it got worse again.  He presents today as he was told there is imaging available here.        Patient is in distress, bent over holding his abdomen.  On exam he has tenderness to left lower and middle abdomen, slight erythema to the tissue in the middle abdomen.  Unsure if this is due to patient clutching his stomach.  Discussed with patient that we do not have access to immediate CT imaging here in the urgent care clinic.   Due to patient's acute presentation, pain, distress, advised patient to present to ED to expedite care.  Patient states his brother drove him to urgent care and he would prefer to be [facility] by his brother.  Transfer center called        1. Lower abdominal pain

## 2023-08-05 LAB
ALBUMIN SERPL BCP-MCNC: 3.4 G/DL (ref 3.2–4.9)
ALBUMIN/GLOB SERPL: 0.9 G/DL
ALP SERPL-CCNC: 77 U/L (ref 30–99)
ALT SERPL-CCNC: 24 U/L (ref 2–50)
ANION GAP SERPL CALC-SCNC: 13 MMOL/L (ref 7–16)
AST SERPL-CCNC: 27 U/L (ref 12–45)
BASOPHILS # BLD AUTO: 0.5 % (ref 0–1.8)
BASOPHILS # BLD: 0.07 K/UL (ref 0–0.12)
BILIRUB SERPL-MCNC: 0.6 MG/DL (ref 0.1–1.5)
BUN SERPL-MCNC: 7 MG/DL (ref 8–22)
CALCIUM ALBUM COR SERPL-MCNC: 8.4 MG/DL (ref 8.5–10.5)
CALCIUM SERPL-MCNC: 7.9 MG/DL (ref 8.5–10.5)
CHLORIDE SERPL-SCNC: 103 MMOL/L (ref 96–112)
CO2 SERPL-SCNC: 18 MMOL/L (ref 20–33)
CREAT SERPL-MCNC: 0.87 MG/DL (ref 0.5–1.4)
EOSINOPHIL # BLD AUTO: 0.05 K/UL (ref 0–0.51)
EOSINOPHIL NFR BLD: 0.4 % (ref 0–6.9)
ERYTHROCYTE [DISTWIDTH] IN BLOOD BY AUTOMATED COUNT: 45.4 FL (ref 35.9–50)
GFR SERPLBLD CREATININE-BSD FMLA CKD-EPI: 111 ML/MIN/1.73 M 2
GLOBULIN SER CALC-MCNC: 3.8 G/DL (ref 1.9–3.5)
GLUCOSE SERPL-MCNC: 104 MG/DL (ref 65–99)
HCT VFR BLD AUTO: 44.7 % (ref 42–52)
HGB BLD-MCNC: 15 G/DL (ref 14–18)
IMM GRANULOCYTES # BLD AUTO: 0.06 K/UL (ref 0–0.11)
IMM GRANULOCYTES NFR BLD AUTO: 0.5 % (ref 0–0.9)
LYMPHOCYTES # BLD AUTO: 1.06 K/UL (ref 1–4.8)
LYMPHOCYTES NFR BLD: 8 % (ref 22–41)
MAGNESIUM SERPL-MCNC: 2 MG/DL (ref 1.5–2.5)
MCH RBC QN AUTO: 31.3 PG (ref 27–33)
MCHC RBC AUTO-ENTMCNC: 33.6 G/DL (ref 32.3–36.5)
MCV RBC AUTO: 93.1 FL (ref 81.4–97.8)
MONOCYTES # BLD AUTO: 1.2 K/UL (ref 0–0.85)
MONOCYTES NFR BLD AUTO: 9 % (ref 0–13.4)
NEUTROPHILS # BLD AUTO: 10.84 K/UL (ref 1.82–7.42)
NEUTROPHILS NFR BLD: 81.6 % (ref 44–72)
NRBC # BLD AUTO: 0 K/UL
NRBC BLD-RTO: 0 /100 WBC (ref 0–0.2)
PLATELET # BLD AUTO: 220 K/UL (ref 164–446)
PMV BLD AUTO: 9.4 FL (ref 9–12.9)
POTASSIUM SERPL-SCNC: 4 MMOL/L (ref 3.6–5.5)
PROT SERPL-MCNC: 7.2 G/DL (ref 6–8.2)
RBC # BLD AUTO: 4.8 M/UL (ref 4.7–6.1)
SODIUM SERPL-SCNC: 134 MMOL/L (ref 135–145)
WBC # BLD AUTO: 13.3 K/UL (ref 4.8–10.8)

## 2023-08-05 PROCEDURE — 36415 COLL VENOUS BLD VENIPUNCTURE: CPT

## 2023-08-05 PROCEDURE — 80053 COMPREHEN METABOLIC PANEL: CPT

## 2023-08-05 PROCEDURE — 99232 SBSQ HOSP IP/OBS MODERATE 35: CPT | Performed by: INTERNAL MEDICINE

## 2023-08-05 PROCEDURE — 700102 HCHG RX REV CODE 250 W/ 637 OVERRIDE(OP): Performed by: INTERNAL MEDICINE

## 2023-08-05 PROCEDURE — 700105 HCHG RX REV CODE 258: Performed by: INTERNAL MEDICINE

## 2023-08-05 PROCEDURE — A9270 NON-COVERED ITEM OR SERVICE: HCPCS | Performed by: INTERNAL MEDICINE

## 2023-08-05 PROCEDURE — 85025 COMPLETE CBC W/AUTO DIFF WBC: CPT

## 2023-08-05 PROCEDURE — 83735 ASSAY OF MAGNESIUM: CPT

## 2023-08-05 PROCEDURE — 700111 HCHG RX REV CODE 636 W/ 250 OVERRIDE (IP): Performed by: INTERNAL MEDICINE

## 2023-08-05 PROCEDURE — 770006 HCHG ROOM/CARE - MED/SURG/GYN SEMI*

## 2023-08-05 RX ADMIN — METRONIDAZOLE 500 MG: 500 INJECTION, SOLUTION INTRAVENOUS at 05:02

## 2023-08-05 RX ADMIN — ENOXAPARIN SODIUM 40 MG: 100 INJECTION SUBCUTANEOUS at 17:18

## 2023-08-05 RX ADMIN — Medication 100 MG: at 05:14

## 2023-08-05 RX ADMIN — OXYCODONE AND ACETAMINOPHEN 1 TABLET: 5; 325 TABLET ORAL at 21:31

## 2023-08-05 RX ADMIN — OXYCODONE AND ACETAMINOPHEN 1 TABLET: 5; 325 TABLET ORAL at 13:59

## 2023-08-05 RX ADMIN — SODIUM CHLORIDE: 9 INJECTION, SOLUTION INTRAVENOUS at 05:01

## 2023-08-05 RX ADMIN — METRONIDAZOLE 500 MG: 500 INJECTION, SOLUTION INTRAVENOUS at 17:19

## 2023-08-05 RX ADMIN — OXYCODONE AND ACETAMINOPHEN 1 TABLET: 5; 325 TABLET ORAL at 05:02

## 2023-08-05 RX ADMIN — CEFTRIAXONE SODIUM 2000 MG: 10 INJECTION, POWDER, FOR SOLUTION INTRAVENOUS at 14:00

## 2023-08-05 ASSESSMENT — PAIN DESCRIPTION - PAIN TYPE
TYPE: ACUTE PAIN

## 2023-08-05 ASSESSMENT — ENCOUNTER SYMPTOMS
ABDOMINAL PAIN: 1
SHORTNESS OF BREATH: 0
FEVER: 0
DEPRESSION: 0
NAUSEA: 0
MYALGIAS: 0
CHILLS: 0
DIARRHEA: 1
DIZZINESS: 0
VOMITING: 0
HEADACHES: 0

## 2023-08-05 ASSESSMENT — FIBROSIS 4 INDEX: FIB4 SCORE: 1.002063985684027404

## 2023-08-05 NOTE — PROGRESS NOTES
4 Eyes Skin Assessment Completed by PATRICIA Gilliland and PATRICIA De La Vega.    Head WDL  Ears WDL  Nose WDL  Mouth WDL  Neck WDL  Breast/Chest WDL  Shoulder Blades WDL  Spine WDL, rash to back, eczema  (R) Arm/Elbow/Hand WDL  (L) Arm/Elbow/Hand WDL  Abdomen Rash, eczema  Groin WDL  Scrotum/Coccyx/Buttocks WDL  (R) Leg WDL,   (L) Leg WDL, rash  (R) Heel/Foot/Toe WDL  (L) Heel/Foot/Toe WDL          Devices In Places Blood Pressure Cuff      Interventions In Place Pillows    Possible Skin Injury No    Pictures Uploaded Into Epic N/A  Wound Consult Placed N/A  RN Wound Prevention Protocol Ordered No

## 2023-08-05 NOTE — PROGRESS NOTES
[facility] Medicine Daily Progress Note    Date of Service  8/5/2023    Chief Complaint  Mohsen Smith is a 38 y.o. male admitted 8/4/2023 with abdominal pain    Hospital Course  38 y.o. male with no chronic medical condition who presented to the hospital on 8/4/2023 with complaint of abdominal pain. His pain is associated with nausea, vomiting and diarrhea.  He has been having loose bowel movement 4-5 times a day for 2 days.  He denies any prior history of abdominal pain or diverticulitis.  He does not take any medications on regular basis.  He expressed that his several of his family number had diverticulitis and one of them had perforation so he was concerned and he decided to come to the hospital.  He denies any other acute complaints or associated symptoms.  He drinks 3-4 beers a day and is smoked half a pack cigarettes a day.  He also smokes marijuana.    Interval Problem Update  Vitals stable. WBC improved 13 today. Anion gap resolved. Lactic wnl.  Patient reports that his nausea has resolved.  He continues to have diarrhea but feels it has improved.  Will increase his diet to GI soft.  Discussed dietary recommendations with diverticulosis with the patient.  Referral placed for outpatient colonoscopy by GI in 6 to 8 weeks after this acute flare.     I have discussed this patient's plan of care and discharge plan at IDT rounds today with Case Management, Nursing, Nursing leadership, and other members of the IDT team.    Consultants/Specialty  N/A    Code Status  Full Code    Disposition  The patient is not medically cleared for discharge to home or a post-acute facility.  Anticipate discharge to: home with close outpatient follow-up    I have placed the appropriate orders for post-discharge needs.    Review of Systems  Review of Systems   Constitutional:  Negative for chills and fever.   Respiratory:  Negative for shortness of breath.    Cardiovascular:  Negative for chest pain.   Gastrointestinal:   Positive for abdominal pain and diarrhea. Negative for nausea and vomiting.   Genitourinary:  Negative for dysuria.   Musculoskeletal:  Negative for myalgias.   Skin:  Negative for rash.   Neurological:  Negative for dizziness and headaches.   Psychiatric/Behavioral:  Negative for depression.    All other systems reviewed and are negative.       Physical Exam  Temp:  [36.2 °C (97.2 °F)-37.2 °C (99 °F)] 36.2 °C (97.2 °F)  Pulse:  [] 75  Resp:  [15-19] 17  BP: (110-165)/(63-86) 110/63  SpO2:  [91 %-96 %] 94 %    Physical Exam  Vitals and nursing note reviewed.   Constitutional:       General: He is not in acute distress.     Appearance: Normal appearance. He is obese.   HENT:      Head: Normocephalic and atraumatic.   Eyes:      Conjunctiva/sclera: Conjunctivae normal.      Pupils: Pupils are equal, round, and reactive to light.   Cardiovascular:      Rate and Rhythm: Normal rate and regular rhythm.      Pulses: Normal pulses.   Pulmonary:      Effort: Pulmonary effort is normal. No respiratory distress.      Breath sounds: Normal breath sounds. No wheezing.   Abdominal:      General: Abdomen is flat. There is no distension.      Palpations: Abdomen is soft.      Tenderness: There is abdominal tenderness (lower abdomen).   Musculoskeletal:         General: No swelling. Normal range of motion.      Cervical back: Normal range of motion and neck supple.   Skin:     General: Skin is warm and dry.      Findings: No rash.   Neurological:      General: No focal deficit present.      Mental Status: He is alert and oriented to person, place, and time.      Cranial Nerves: No cranial nerve deficit.   Psychiatric:         Mood and Affect: Mood normal.         Behavior: Behavior normal.         Fluids    Intake/Output Summary (Last 24 hours) at 8/5/2023 1154  Last data filed at 8/5/2023 0917  Gross per 24 hour   Intake 1940 ml   Output --   Net 1940 ml       Laboratory  Recent Labs     08/04/23  1255 08/05/23  0351   WBC  21.9* 13.3*   RBC 5.53 4.80   HEMOGLOBIN 17.6 15.0   HEMATOCRIT 52.2* 44.7   MCV 94.4 93.1   MCH 31.8 31.3   MCHC 33.7 33.6   RDW 46.2 45.4   PLATELETCT 265 220   MPV 8.9* 9.4     Recent Labs     08/04/23  1255 08/05/23  0351   SODIUM 137 134*   POTASSIUM 3.9 4.0   CHLORIDE 102 103   CO2 18* 18*   GLUCOSE 110* 104*   BUN 10 7*   CREATININE 0.92 0.87   CALCIUM 9.6 7.9*                   Imaging  CT-ABDOMEN-PELVIS WITH   Final Result      1.  Sigmoid diverticulitis with probable microperforation. No evidence of drainable fluid collection.   2.  Hepatic steatosis.   3.  Hepatomegaly.           Assessment/Plan  * Sigmoid diverticulitis- (present on admission)  Assessment & Plan  Patient found to have sigmoid diverticulitis on CT scan.  He has abdominal tenderness on physical exam.  He denies prior history of diverticulitis.  He does not have any drainable fluid collection on CT scan.  I did not consult general surgery.  Pain control, IV morphine every 4 hours as needed and oxycodone for moderate pain.  Monitor for adverse effect of narcotic pain medication.  Continue IV ceftriaxone and Flagyl.  Increase to GI soft diet  Continue IV fluids    Obesity (BMI 35.0-39.9 without comorbidity)  Assessment & Plan  Lifestyle modifications.  Outpatient follow-up.    Leukocytosis  Assessment & Plan  He found to have leukocytosis most likely secondary to sigmoid diverticulitis  I started him on IV antibiotics  I ordered CBC for tomorrow.    Improving    High anion gap metabolic acidosis  Assessment & Plan  He found to have high anion gap metabolic acidosis most likely secondary to dehydration and diarrhea.  I started him on IV fluid  Ordered CMP for tomorrow.  Continue to monitor.    Resolved    Tobacco abuse  Assessment & Plan  Counseled on cessation on admission         VTE prophylaxis: Lovenox    I have performed a physical exam and reviewed and updated ROS and Plan today (8/5/2023). In review of yesterday's note (8/4/2023), there  are no changes except as documented above.

## 2023-08-05 NOTE — ASSESSMENT & PLAN NOTE
He found to have high anion gap metabolic acidosis most likely secondary to dehydration and diarrhea.  I started him on IV fluid  Ordered CMP for tomorrow.  Continue to monitor.    Resolved

## 2023-08-05 NOTE — ASSESSMENT & PLAN NOTE
He found to have leukocytosis most likely secondary to sigmoid diverticulitis  I started him on IV antibiotics  I ordered CBC for tomorrow.    Improving

## 2023-08-05 NOTE — H&P
Hospital Medicine History & Physical Note    Date of Service  8/4/2023    Primary Care Physician  Pcp Pt States None    Consultants  None    Specialist Names: N/A    Code Status  Full Code    Chief Complaint  Chief Complaint   Patient presents with    Abdominal Pain     X3 days. Lower midline. Pt endorses diarrhea and dysuria, denies blood in urine or stool.        History of Presenting Illness    Mohsen Smith is a 38 y.o. male with no chronic medical condition who presented to the hospital on 8/4/2023 with complaint of abdominal pain.  Patient reported that he started having abdominal pain 2 days ago and it has been progressively getting worse.  He describes abdominal pain in his lower abdominal and sometimes abdominal pain radiates if he coughs otherwise there is no specific radiation of his pain.  There is no specific aggravating or alleviating factors for his pain.  His pain is associated with nausea, vomiting and diarrhea.  He has been having loose bowel movement 4-5 times a day for 2 days.  He denies any prior history of abdominal pain or diverticulitis.  He does not take any medications on regular basis.  He expressed that his several of his family number had diverticulitis and one of them had perforation so he was concerned and he decided to come to the hospital.  He denies any other acute complaints or associated symptoms.  He drinks 3-4 beers a day and is smoked half a pack cigarettes a day.  He also smokes marijuana.      I discussed about this admission with ER physician Dr. Simms    I discussed the plan of care with patient and bedside RN.    Review of Systems  Review of Systems   Constitutional:  Negative for chills, fever and weight loss.   HENT:  Negative for hearing loss and tinnitus.    Eyes:  Negative for blurred vision, double vision, photophobia and pain.   Respiratory:  Negative for cough, sputum production and shortness of breath.    Cardiovascular:  Negative for chest pain,  palpitations, orthopnea and leg swelling.   Gastrointestinal:  Positive for abdominal pain, diarrhea, nausea and vomiting. Negative for blood in stool and constipation.   Genitourinary:  Negative for dysuria, frequency and urgency.   Musculoskeletal:  Negative for back pain, joint pain, myalgias and neck pain.   Skin:  Negative for rash.   Neurological:  Negative for dizziness, tingling, tremors, sensory change, speech change, focal weakness and headaches.   Psychiatric/Behavioral:  Negative for hallucinations and substance abuse.    All other systems reviewed and are negative.      Past Medical History  I reviewed tobacco abuse    Surgical History  I reviewed musculoskeletal surgery    Family History     Family history reviewed with patient. There is no family history that is pertinent to the chief complaint.     Social History   reports that he has been smoking cigarettes. He has been smoking an average of .5 packs per day. He uses smokeless tobacco. He reports current alcohol use. He reports that he does not use drugs.    Allergies  No Known Allergies    Medications  None       Physical Exam  Temp:  [36.2 °C (97.2 °F)-37.8 °C (100 °F)] 36.2 °C (97.2 °F)  Pulse:  [100-122] 100  Resp:  [15-18] 15  BP: (124-165)/(77-86) 133/77  SpO2:  [91 %-98 %] 91 %  Blood Pressure: 133/77   Temperature: 36.2 °C (97.2 °F)   Pulse: 100   Respiration: 15   Pulse Oximetry: 91 %       Physical Exam  Vitals reviewed.   Constitutional:       General: He is not in acute distress.     Appearance: He is obese.   HENT:      Head: Normocephalic and atraumatic. No contusion.      Nose: Nose normal.      Mouth/Throat:      Pharynx: No oropharyngeal exudate.   Eyes:      Pupils: Pupils are equal, round, and reactive to light.   Cardiovascular:      Rate and Rhythm: Normal rate and regular rhythm.   Pulmonary:      Effort: Pulmonary effort is normal.      Breath sounds: No wheezing or rhonchi.   Abdominal:      General: Bowel sounds are normal.       Palpations: Abdomen is soft.      Tenderness: There is abdominal tenderness (Suprapubic).   Musculoskeletal:         General: Normal range of motion.      Cervical back: No rigidity. No muscular tenderness.   Skin:     General: Skin is warm and dry.      Coloration: Skin is not jaundiced.      Comments: Eczema on multiple spots on bilateral lower extremities   Neurological:      General: No focal deficit present.      Mental Status: He is alert and oriented to person, place, and time.      Cranial Nerves: No cranial nerve deficit.   Psychiatric:         Mood and Affect: Mood normal.         Laboratory:  Recent Labs     08/04/23  1255   WBC 21.9*   RBC 5.53   HEMOGLOBIN 17.6   HEMATOCRIT 52.2*   MCV 94.4   MCH 31.8   MCHC 33.7   RDW 46.2   PLATELETCT 265   MPV 8.9*     Recent Labs     08/04/23  1255   SODIUM 137   POTASSIUM 3.9   CHLORIDE 102   CO2 18*   GLUCOSE 110*   BUN 10   CREATININE 0.92   CALCIUM 9.6     Recent Labs     08/04/23  1255   ALTSGPT 30   ASTSGOT 19   ALKPHOSPHAT 86   TBILIRUBIN 0.8   LIPASE 14   GLUCOSE 110*         No results for input(s): NTPROBNP in the last 72 hours.      No results for input(s): TROPONINT in the last 72 hours.    Imaging:  CT-ABDOMEN-PELVIS WITH   Final Result      1.  Sigmoid diverticulitis with probable microperforation. No evidence of drainable fluid collection.   2.  Hepatic steatosis.   3.  Hepatomegaly.              Assessment/Plan:  Justification for Admission Status  I anticipate this patient will require at least two midnights for appropriate medical management, necessitating inpatient admission because for management of diverticulitis with IV antibiotic administration and slowly advance diet as tolerated.    Patient will need a Med/Surg bed on MEDICAL service .  The need is secondary to sigmoid diverticulitis.    * Sigmoid diverticulitis- (present on admission)  Assessment & Plan  Patient found to have sigmoid diverticulitis on CT scan.  He has abdominal  tenderness on physical exam.  He denies prior history of diverticulitis.  He does not have any drainable fluid collection on CT scan.  I did not consult general surgery.  I started him on pain control with IV morphine every 4 hours as needed and oxycodone for moderate pain.  Monitor for adverse effect of narcotic pain medication.  I started him on antibiotic with IV ceftriaxone and Flagyl.  Blood cultures ordered.  I discussed plan of care with him and I answered all his questions per  After discussion I started him on clear liquid diet for now if he will tolerate plan is to advance diet as tolerated starting from tomorrow morning.        Obesity (BMI 35.0-39.9 without comorbidity)  Assessment & Plan  Lifestyle modifications.  Outpatient follow-up.    Leukocytosis  Assessment & Plan  He found to have leukocytosis most likely secondary to sigmoid diverticulitis  I started him on IV antibiotics  I ordered CBC for tomorrow.    High anion gap metabolic acidosis  Assessment & Plan  He found to have high anion gap metabolic acidosis most likely secondary to dehydration and diarrhea.  I started him on IV fluid  Ordered CMP for tomorrow.  Continue to monitor.    Tobacco abuse  Assessment & Plan  He smokes 10 cigarettes a day and he also drinks 3-4 beers and smoked marijuana.  He reported that he quit using methamphetamine in 2009.  I provided him counseling regarding complete smoking cessation and offered him nicotine patches but he declined.  I also provided him counseling regarding alcohol consumption.    Time spent 4 minutes      I discussed about his admission with ER physician Dr. Simms.      VTE prophylaxis: enoxaparin ppx

## 2023-08-05 NOTE — ASSESSMENT & PLAN NOTE
Patient found to have sigmoid diverticulitis on CT scan.  He has abdominal tenderness on physical exam.  He denies prior history of diverticulitis.  He does not have any drainable fluid collection on CT scan.  I did not consult general surgery.  Pain control, IV morphine every 4 hours as needed and oxycodone for moderate pain.  Monitor for adverse effect of narcotic pain medication.  Continue IV ceftriaxone and Flagyl.  Increase to GI soft diet  Continue IV fluids

## 2023-08-05 NOTE — PROGRESS NOTES
Received report from day shift RN, on initial rounds patient lying in ed, states pain medication was received and it is working. Pain level at this time is 4/10, tolerable. AAOX4. Ambulates to BR independently. No furthr concerns, bed is locked and in low position, call bell within reach, will continue  to monitor.

## 2023-08-06 ENCOUNTER — PHARMACY VISIT (OUTPATIENT)
Dept: PHARMACY | Facility: MEDICAL CENTER | Age: 38
End: 2023-08-06
Payer: COMMERCIAL

## 2023-08-06 VITALS
HEIGHT: 71 IN | SYSTOLIC BLOOD PRESSURE: 124 MMHG | RESPIRATION RATE: 16 BRPM | DIASTOLIC BLOOD PRESSURE: 88 MMHG | TEMPERATURE: 97.1 F | WEIGHT: 260.58 LBS | HEART RATE: 80 BPM | OXYGEN SATURATION: 96 % | BODY MASS INDEX: 36.48 KG/M2

## 2023-08-06 LAB
ANION GAP SERPL CALC-SCNC: 10 MMOL/L (ref 7–16)
BUN SERPL-MCNC: 6 MG/DL (ref 8–22)
CALCIUM SERPL-MCNC: 8.5 MG/DL (ref 8.5–10.5)
CHLORIDE SERPL-SCNC: 105 MMOL/L (ref 96–112)
CO2 SERPL-SCNC: 23 MMOL/L (ref 20–33)
CREAT SERPL-MCNC: 0.91 MG/DL (ref 0.5–1.4)
ERYTHROCYTE [DISTWIDTH] IN BLOOD BY AUTOMATED COUNT: 45.9 FL (ref 35.9–50)
GFR SERPLBLD CREATININE-BSD FMLA CKD-EPI: 109 ML/MIN/1.73 M 2
GLUCOSE SERPL-MCNC: 99 MG/DL (ref 65–99)
HCT VFR BLD AUTO: 46 % (ref 42–52)
HGB BLD-MCNC: 15 G/DL (ref 14–18)
MCH RBC QN AUTO: 31.1 PG (ref 27–33)
MCHC RBC AUTO-ENTMCNC: 32.6 G/DL (ref 32.3–36.5)
MCV RBC AUTO: 95.2 FL (ref 81.4–97.8)
PLATELET # BLD AUTO: 236 K/UL (ref 164–446)
PMV BLD AUTO: 9.6 FL (ref 9–12.9)
POTASSIUM SERPL-SCNC: 3.9 MMOL/L (ref 3.6–5.5)
RBC # BLD AUTO: 4.83 M/UL (ref 4.7–6.1)
SODIUM SERPL-SCNC: 138 MMOL/L (ref 135–145)
WBC # BLD AUTO: 9.9 K/UL (ref 4.8–10.8)

## 2023-08-06 PROCEDURE — 700111 HCHG RX REV CODE 636 W/ 250 OVERRIDE (IP): Performed by: INTERNAL MEDICINE

## 2023-08-06 PROCEDURE — 80048 BASIC METABOLIC PNL TOTAL CA: CPT

## 2023-08-06 PROCEDURE — 36415 COLL VENOUS BLD VENIPUNCTURE: CPT

## 2023-08-06 PROCEDURE — 99239 HOSP IP/OBS DSCHRG MGMT >30: CPT | Performed by: INTERNAL MEDICINE

## 2023-08-06 PROCEDURE — RXMED WILLOW AMBULATORY MEDICATION CHARGE: Performed by: INTERNAL MEDICINE

## 2023-08-06 PROCEDURE — 85027 COMPLETE CBC AUTOMATED: CPT

## 2023-08-06 PROCEDURE — 700102 HCHG RX REV CODE 250 W/ 637 OVERRIDE(OP): Performed by: INTERNAL MEDICINE

## 2023-08-06 PROCEDURE — A9270 NON-COVERED ITEM OR SERVICE: HCPCS | Performed by: INTERNAL MEDICINE

## 2023-08-06 RX ORDER — ACETAMINOPHEN 325 MG/1
650 TABLET ORAL EVERY 6 HOURS PRN
Qty: 30 TABLET | Refills: 0 | COMMUNITY
Start: 2023-08-06

## 2023-08-06 RX ORDER — AMOXICILLIN AND CLAVULANATE POTASSIUM 875; 125 MG/1; MG/1
1 TABLET, FILM COATED ORAL 2 TIMES DAILY
Qty: 16 TABLET | Refills: 0 | Status: ACTIVE | OUTPATIENT
Start: 2023-08-06 | End: 2023-08-14

## 2023-08-06 RX ADMIN — Medication 100 MG: at 05:08

## 2023-08-06 RX ADMIN — METRONIDAZOLE 500 MG: 500 INJECTION, SOLUTION INTRAVENOUS at 05:07

## 2023-08-06 ASSESSMENT — PAIN DESCRIPTION - PAIN TYPE
TYPE: ACUTE PAIN
TYPE: ACUTE PAIN

## 2023-08-06 NOTE — CARE PLAN
The patient is Stable - Low risk of patient condition declining or worsening    Shift Goals  Clinical Goals: pain control 5/10 or less.  Patient Goals: tolerating diet.  Family Goals: SRI    Progress made toward(s) clinical / shift goals:  Patient verbalizes understanding of plan of care. Pain reduction from 6/10 to 4/10, patient's comfort goal.    Problem: Pain - Standard  Goal: Alleviation of pain or a reduction in pain to the patient’s comfort goal  Outcome: Progressing     Problem: Knowledge Deficit - Standard  Goal: Patient and family/care givers will demonstrate understanding of plan of care, disease process/condition, diagnostic tests and medications  Outcome: Progressing       Patient is not progressing towards the following goals:

## 2023-08-06 NOTE — DISCHARGE SUMMARY
Discharge Summary    CHIEF COMPLAINT ON ADMISSION  Chief Complaint   Patient presents with    Abdominal Pain     X3 days. Lower midline. Pt endorses diarrhea and dysuria, denies blood in urine or stool.        Reason for Admission  sent by MD     Admission Date  8/4/2023    CODE STATUS  Full Code    HPI & HOSPITAL COURSE  This is a 38 y.o. male here with abdominal pain.     38 y.o. male with no chronic medical condition who presented to the hospital on 8/4/2023 with complaint of abdominal pain. His pain is associated with nausea, vomiting and diarrhea.  He has been having loose bowel movement 4-5 times a day for 2 days.  CT scan showed sigmoid diverticulitis with possible microperforation, no evidence of drainable fluid collection.  Patient was started on IV antibiotics and IV fluids.  Patient had significant improvement, his leukocytosis has resolved and he is having formed stools.  Referral has been placed for outpatient GI for evaluation for colonoscopy in 6 to 8 weeks.  Patient will finish his antibiotic course with Augmentin.  Educated on diverticulosis diet and to follow-up with primary care.  Patient's vital signs are stable and he is requesting to be discharged home.  He is to return to the ER if his condition worsens.    Therefore, he is discharged in good and stable condition to home with close outpatient follow-up.    The patient met 2-midnight criteria for an inpatient stay at the time of discharge.    Discharge Date  8/6/2023    FOLLOW UP ITEMS POST DISCHARGE  Follow up with primary care   Referral placed for outpatient GI     DISCHARGE DIAGNOSES  Principal Problem:    Sigmoid diverticulitis (POA: Yes)  Active Problems:    Tobacco abuse (POA: Unknown)    High anion gap metabolic acidosis (POA: Unknown)    Leukocytosis (POA: Unknown)    Obesity (BMI 35.0-39.9 without comorbidity) (POA: Unknown)  Resolved Problems:    * No resolved hospital problems. *      FOLLOW UP  No future appointments.  Community  [facility] ([facility] [facility]) - Primary Care and Family Medicine  [address] [place] [id number]  [phone]  Follow up  Call and make an appointment to establish care with primary care provider      MEDICATIONS ON DISCHARGE     Medication List        START taking these medications        Instructions   acetaminophen 325 MG Tabs  Commonly known as: Tylenol   Take 2 Tablets by mouth every 6 hours as needed for Fever, Moderate Pain or Mild Pain.  Dose: 650 mg     amoxicillin-clavulanate 875-125 MG Tabs  Commonly known as: Augmentin   Take 1 Tablet by mouth 2 times a day for 8 days.  Dose: 1 Tablet              Allergies  No Known Allergies    DIET  Orders Placed This Encounter   Procedures    Diet Order Diet: Low Fiber(GI Soft)     Standing Status:   Standing     Number of Occurrences:   1     Order Specific Question:   Diet:     Answer:   Low Fiber(GI Soft) [2]    Discontinue Diet Tray     Standing Status:   Standing     Number of Occurrences:   1       ACTIVITY  As tolerated.  Weight bearing as tolerated    CONSULTATIONS  N/A    PROCEDURES  N/A    LABORATORY  Lab Results   Component Value Date    SODIUM 138 08/06/2023    POTASSIUM 3.9 08/06/2023    CHLORIDE 105 08/06/2023    CO2 23 08/06/2023    GLUCOSE 99 08/06/2023    BUN 6 (L) 08/06/2023    CREATININE 0.91 08/06/2023        Lab Results   Component Value Date    WBC 9.9 08/06/2023    HEMOGLOBIN 15.0 08/06/2023    HEMATOCRIT 46.0 08/06/2023    PLATELETCT 236 08/06/2023        Total time of the discharge process exceeds 32 minutes.

## 2023-08-06 NOTE — PROGRESS NOTES
Received report from day shift RN, on initial rounds pt in bed AAOX4, denies pain, patient up to [address] to ambulate, steady gait observed. Patient's bed os locked and in low position, call bell and patient's belongings within reach.

## 2023-08-06 NOTE — CARE PLAN
The patient is Stable - Low risk of patient condition declining or worsening    Shift Goals  Clinical Goals: pain of 5 or less reported  Patient Goals: advance diet  Family Goals: SRI    Progress made toward(s) clinical / shift goals:  pain well controlled with PRN percocet pan for discharge tomorrow       Problem: Pain - Standard  Goal: Alleviation of pain or a reduction in pain to the patient’s comfort goal  Outcome: Progressing     Problem: Knowledge Deficit - Standard  Goal: Patient and family/care givers will demonstrate understanding of plan of care, disease process/condition, diagnostic tests and medications  Outcome: Progressing

## 2023-08-06 NOTE — PROGRESS NOTES
Discharge education and information provided to patient. All questions answered. Meds-to-beds delivered. All belongings given. Patient discharged home with son and wife.

## 2023-08-07 ENCOUNTER — TELEPHONE (OUTPATIENT)
Dept: HEALTH INFORMATION MANAGEMENT | Facility: OTHER | Age: 38
End: 2023-08-07

## 2023-08-09 LAB
BACTERIA BLD CULT: NORMAL
BACTERIA BLD CULT: NORMAL
SIGNIFICANT IND 70042: NORMAL
SIGNIFICANT IND 70042: NORMAL
SITE SITE: NORMAL
SITE SITE: NORMAL
SOURCE SOURCE: NORMAL
SOURCE SOURCE: NORMAL